# Patient Record
Sex: MALE | Race: WHITE | URBAN - METROPOLITAN AREA
[De-identification: names, ages, dates, MRNs, and addresses within clinical notes are randomized per-mention and may not be internally consistent; named-entity substitution may affect disease eponyms.]

---

## 2017-02-21 ENCOUNTER — MYC MEDICAL ADVICE (OUTPATIENT)
Dept: FAMILY MEDICINE | Facility: CLINIC | Age: 62
End: 2017-02-21

## 2017-02-21 DIAGNOSIS — F51.04 CHRONIC INSOMNIA: ICD-10-CM

## 2017-02-21 RX ORDER — ZOLPIDEM TARTRATE 12.5 MG/1
12.5 TABLET, FILM COATED, EXTENDED RELEASE ORAL
Qty: 30 TABLET | Refills: 5 | Status: SHIPPED | OUTPATIENT
Start: 2017-02-21 | End: 2017-08-30

## 2017-08-30 DIAGNOSIS — F51.04 CHRONIC INSOMNIA: ICD-10-CM

## 2017-08-30 RX ORDER — ZOLPIDEM TARTRATE 12.5 MG/1
12.5 TABLET, FILM COATED, EXTENDED RELEASE ORAL
Qty: 30 TABLET | Refills: 5 | Status: SHIPPED | OUTPATIENT
Start: 2017-08-30 | End: 2018-03-15

## 2017-08-30 NOTE — TELEPHONE ENCOUNTER
zolpidem (AMBIEN CR) 12.5 MG CR tablet      Last Written Prescription Date:  2/21/17  Last Fill Quantity: 30,   # refills: 5  Last Office Visit with [facility], [facility] or [facility] prescribing provider: 11/22/16  Future Office visit:       Routing refill request to provider for review/approval because:  Drug not on the [facility], [facility] or [facility] refill protocol or controlled substance

## 2018-03-15 DIAGNOSIS — F51.04 CHRONIC INSOMNIA: ICD-10-CM

## 2018-03-15 RX ORDER — ZOLPIDEM TARTRATE 12.5 MG/1
12.5 TABLET, FILM COATED, EXTENDED RELEASE ORAL
Qty: 30 TABLET | Refills: 0 | Status: SHIPPED | OUTPATIENT
Start: 2018-03-15

## 2018-03-15 NOTE — TELEPHONE ENCOUNTER
Please let patient know I sent in a refill, but he needs to establish with a doc out there.  I can't keep doing remote medicine

## 2018-03-15 NOTE — TELEPHONE ENCOUNTER
Requested Prescriptions   Pending Prescriptions Disp Refills     zolpidem (AMBIEN CR) 12.5 MG CR tablet 30 tablet 5     Sig: Take 1 tablet (12.5 mg) by mouth nightly as needed for sleep    There is no refill protocol information for this order        zolpidem (AMBIEN CR) 12.5 MG CR tablet      Last Written Prescription Date:  8/30/17  Last Fill Quantity: 30,   # refills: 5  Last Office Visit: 11/22/16  Future Office visit:       Routing refill request to provider for review/approval because:  Drug not on the [facility], [facility] or [facility] refill protocol or controlled substance

## 2018-06-29 DIAGNOSIS — F51.04 CHRONIC INSOMNIA: ICD-10-CM

## 2018-06-29 RX ORDER — ZOLPIDEM TARTRATE 12.5 MG/1
TABLET, FILM COATED, EXTENDED RELEASE ORAL
Qty: 30 TABLET | Refills: 0 | OUTPATIENT
Start: 2018-06-29

## 2018-06-29 NOTE — TELEPHONE ENCOUNTER
Requested Prescriptions   Pending Prescriptions Disp Refills     zolpidem (AMBIEN CR) 12.5 MG CR tablet [Pharmacy Med Name: Zolpidem Tartrate ER Oral Tablet Extended Release 12.5 MG]      Last Written Prescription Date:  3/15/18  Last Fill Quantity: 30 tablet,   # refills: 0  Last Office Visit: 11/22/16 Dr. Beck  Future Office visit:       Routing refill request to provider for review/approval because:  Drug not on the [facility], [facility] or  Health refill protocol or controlled substance 30 tablet 0     Sig: TAKE 1 TABLET BY MOUTH NIGHTLY AS NEEDED FOR SLEEP    There is no refill protocol information for this order

## 2018-06-29 NOTE — TELEPHONE ENCOUNTER
Routing refill request to provider for review/approval because:  Drug not on the [facility] refill protocol   Kimberlyn Aleman RN

## 2018-06-30 DIAGNOSIS — F51.04 CHRONIC INSOMNIA: ICD-10-CM

## 2018-07-02 RX ORDER — ZOLPIDEM TARTRATE 12.5 MG/1
TABLET, FILM COATED, EXTENDED RELEASE ORAL
Qty: 30 TABLET | Refills: 0 | OUTPATIENT
Start: 2018-07-02

## 2018-07-02 NOTE — TELEPHONE ENCOUNTER
Routing refill request to provider for review/approval because:  Drug not on the [facility] refill protocol   Teri Gresham RN

## 2018-07-03 DIAGNOSIS — F51.04 CHRONIC INSOMNIA: ICD-10-CM

## 2018-07-03 RX ORDER — ZOLPIDEM TARTRATE 12.5 MG/1
TABLET, FILM COATED, EXTENDED RELEASE ORAL
Qty: 30 TABLET | Refills: 0 | OUTPATIENT
Start: 2018-07-03

## 2018-07-03 NOTE — TELEPHONE ENCOUNTER
Requested Prescriptions   Pending Prescriptions Disp Refills     zolpidem (AMBIEN CR) 12.5 MG CR tablet [Pharmacy Med Name: Zolpidem Tartrate ER Oral Tablet Extended Release 12.5 MG]        Last Written Prescription Date:  3/15/18  Last Fill Quantity: 30 tablet,   # refills: 0  Last Office Visit: Dr. Beck  11/22/16  Future Office visit:       Routing refill request to provider for review/approval because:  Drug not on the [facility], [facility] or [facility] refill protocol or controlled substance 30 tablet 0     Sig: TAKE 1 TABLET BY MOUTH NIGHTLY AS NEEDED FOR SLEEP    There is no refill protocol information for this order

## 2018-08-14 DIAGNOSIS — F51.04 CHRONIC INSOMNIA: ICD-10-CM

## 2018-08-14 NOTE — TELEPHONE ENCOUNTER
Requested Prescriptions   Pending Prescriptions Disp Refills     zolpidem (AMBIEN CR) 12.5 MG CR tablet [Pharmacy Med Name: Zolpidem Tartrate ER Oral Tablet Extended Release 12.5 MG] 30 tablet 0     Sig: TAKE 1 TABLET BY MOUTH NIGHTLY AS NEEDED FOR SLEEP    There is no refill protocol information for this order        Routing refill request to provider for review/approval because:  Drug not on the [facility] refill protocol     Shiva Acevedo RN, BSN

## 2018-08-15 RX ORDER — ZOLPIDEM TARTRATE 12.5 MG/1
TABLET, FILM COATED, EXTENDED RELEASE ORAL
Qty: 30 TABLET | Refills: 0 | OUTPATIENT
Start: 2018-08-15

## 2019-10-05 ENCOUNTER — HEALTH MAINTENANCE LETTER (OUTPATIENT)
Age: 64
End: 2019-10-05

## 2020-02-10 ENCOUNTER — HEALTH MAINTENANCE LETTER (OUTPATIENT)
Age: 65
End: 2020-02-10

## 2020-11-14 ENCOUNTER — HEALTH MAINTENANCE LETTER (OUTPATIENT)
Age: 65
End: 2020-11-14

## 2021-03-28 ENCOUNTER — HEALTH MAINTENANCE LETTER (OUTPATIENT)
Age: 66
End: 2021-03-28

## 2021-09-12 ENCOUNTER — HEALTH MAINTENANCE LETTER (OUTPATIENT)
Age: 66
End: 2021-09-12

## 2022-02-07 ENCOUNTER — TRANSFERRED RECORDS (OUTPATIENT)
Dept: HEALTH INFORMATION MANAGEMENT | Facility: CLINIC | Age: 67
End: 2022-02-07
Payer: MEDICARE

## 2022-04-24 ENCOUNTER — HEALTH MAINTENANCE LETTER (OUTPATIENT)
Age: 67
End: 2022-04-24

## 2022-05-16 PROCEDURE — 88305 TISSUE EXAM BY PATHOLOGIST: CPT | Mod: TC,ORL | Performed by: COLON & RECTAL SURGERY

## 2022-05-16 PROCEDURE — 88305 TISSUE EXAM BY PATHOLOGIST: CPT | Mod: 26 | Performed by: PATHOLOGY

## 2022-05-17 ENCOUNTER — LAB REQUISITION (OUTPATIENT)
Dept: LAB | Facility: CLINIC | Age: 67
End: 2022-05-17
Payer: MEDICARE

## 2022-05-18 LAB
PATH REPORT.COMMENTS IMP SPEC: NORMAL
PATH REPORT.COMMENTS IMP SPEC: NORMAL
PATH REPORT.FINAL DX SPEC: NORMAL
PATH REPORT.GROSS SPEC: NORMAL
PATH REPORT.MICROSCOPIC SPEC OTHER STN: NORMAL
PATH REPORT.RELEVANT HX SPEC: NORMAL
PHOTO IMAGE: NORMAL

## 2022-11-19 ENCOUNTER — HEALTH MAINTENANCE LETTER (OUTPATIENT)
Age: 67
End: 2022-11-19

## 2023-06-01 ENCOUNTER — HEALTH MAINTENANCE LETTER (OUTPATIENT)
Age: 68
End: 2023-06-01

## 2025-05-06 ENCOUNTER — TRANSCRIBE ORDERS (OUTPATIENT)
Dept: OTHER | Age: 70
End: 2025-05-06

## 2025-05-06 DIAGNOSIS — M25.511 SHOULDER PAIN, RIGHT: Primary | ICD-10-CM

## 2025-05-08 ENCOUNTER — THERAPY VISIT (OUTPATIENT)
Dept: PHYSICAL THERAPY | Facility: CLINIC | Age: 70
End: 2025-05-08
Payer: MEDICARE

## 2025-05-08 DIAGNOSIS — M25.511 SHOULDER PAIN, RIGHT: ICD-10-CM

## 2025-05-08 ASSESSMENT — ACTIVITIES OF DAILY LIVING (ADL)
WHEN_LYING_ON_THE_INVOLVED_SIDE: 8
PUTTING_ON_A_SHIRT_THAT_BUTTONS_DOWN_THE_FRONT: 5
TOUCHING_THE_BACK_OF_YOUR_NECK: 5
PUTTING_ON_AN_UNDERSHIRT_OR_A_PULLOVER_SWEATER: 8
AT_ITS_WORST?: 8
REMOVING_SOMETHING_FROM_YOUR_BACK_POCKET: 4
REACHING_FOR_SOMETHING_ON_A_HIGH_SHELF: 10
WASHING_YOUR_BACK: 10
PUSHING_WITH_THE_INVOLVED_ARM: 6
PLACING_AN_OBJECT_ON_A_HIGH_SHELF: 10
CARRYING_A_HEAVY_OBJECT_OF_10_POUNDS: 0
PLEASE_INDICATE_YOR_PRIMARY_REASON_FOR_REFERRAL_TO_THERAPY:: SHOULDER
WASHING_YOUR_HAIR?: 6
PUTTING_ON_YOUR_PANTS: 4

## 2025-05-08 NOTE — PROGRESS NOTES
PHYSICAL THERAPY EVALUATION  Type of Visit: Evaluation        Fall Risk Screen:  Have you fallen 2 or more times in the past year?: No  Have you fallen and had an injury in the past year?: No    Subjective         Presenting condition or subjective complaint: pt has been having shoulder pain for several years but has become more problematic recently. He first went to see Dr. Trammell and then secondly with Dr. Gonzalez. Both are recommending a TSA at some point.  Date of onset: 02/08/25 (sx have been ongoing for 2-3 years but worsened last 3 months)    Relevant medical history: Osteoarthritis; Cancer; High blood pressure (stage 1 cancer on R kidney and had kidney removal in 2009,)   Dates & types of surgery: L4/5 nerve decompression   9/24    Prior diagnostic imaging/testing results: MRI; X-ray     Prior therapy history for the same diagnosis, illness or injury: Yes 2023        Living Environment  Social support: With a significant other or spouse   Type of home: House   Stairs to enter the home: Yes 3 Is there a railing: Yes     Ramp: No   Stairs inside the home: Yes 8 Is there a railing: Yes     Help at home: None  Equipment owned:       Employment: No    Hobbies/Interests: golf, swim, pickleball, bike, alpine ski race, wood craft    Patient goals for therapy: pickleball sleep on my right side    Pain assessment: See objective evaluation for additional pain details     Objective   Pain:   Location: anterior and posterior and will sometimes radiate down his lateral arm  At rest: 1/10  With activity: 8/10  Quality: dull ache, sharp that is intermittent if he does something wrong  Frequency: Intermittent  Time of Day: worse in am  Pain is exacerbated by: reaching overhead, behind back, lying on shoulder and lifting, swimming and pickleball  Pain is relieved by: rest, ice, and tylenol  Range of Motion:      AROM L shoulder: 150/140/T8/70     AAROM R shoulder: table slide flexion=94 and at [id number] tried wand flexion and  abduction but way too much hiking in [place] and pain    AROM R shoulder: 82/60/L5/41    Strength:  L shoulder: WNL    R shoulder: NT due to lack of motion  Notable mechanics: R scapular medial border prominence, winging and upward translation with abduction>flexion and pain specifically with eccentric phase.   Special Tests: NT  Palpation: Tension and hypertonicity noted at R [place], LS, rhomboids  Posture: Fwd head, rounded shoulders and elevated scapula on R  Joint mobility: hypomobility noted on R shoulder in all directions.      Assessment & Plan   CLINICAL IMPRESSIONS  Medical Diagnosis: shoulder pain, right with advanced OA    Treatment Diagnosis: R shoulder pain   Impression/Assessment: Patient is a 69 year old male with R shoulder complaints.  The following significant findings have been identified: Pain, Decreased ROM/flexibility, Decreased joint mobility, and Decreased strength. These impairments interfere with their ability to perform self care tasks, work tasks, recreational activities, household chores, and driving  as compared to previous level of function.     Clinical Decision Making (Complexity):  Clinical Presentation: Stable/Uncomplicated  Clinical Presentation Rationale: based on medical and personal factors listed in PT evaluation  Clinical Decision Making (Complexity): Low complexity    PLAN OF CARE  Treatment Interventions:  Interventions: Manual Therapy, Neuromuscular Re-education, Therapeutic Activity, Therapeutic Exercise    Long Term Goals     PT Goal 1  Goal Identifier: reaching and lifting  Goal Description: improve ROM and strength to tolerate lifting 10-15# in preps for surgery from floor to waist and reach into lower shelf of OH cabinet without hiking or pain  Rationale: to maximize safety and independence within the home;to maximize safety and independence with performance of ADLs and functional tasks;to maximize safety and independence with self cares  Target Date: 07/31/25      Frequency  of Treatment: 1x/wk for 6 weeks then every other week for 6 weeks  Duration of Treatment: 12 weeks    Recommended Referrals to Other Professionals: Physical Therapy  Education Assessment:        Risks and benefits of evaluation/treatment have been explained.   Patient/Family/caregiver agrees with Plan of Care.     Evaluation Time:     PT Eval, Low Complexity Minutes (98536): 18  Evaluation Only     Signing Clinician: ANNIE Teague [facility]                                                                                   OUTPATIENT PHYSICAL THERAPY      PLAN OF TREATMENT FOR OUTPATIENT REHABILITATION   Patient's Last Name, First Name, CONNERWANG  Sarabjit Arvizu YOB: 1955   Provider's Name   [facility]   Medical Record No.  [id number]     Onset Date: 02/08/25 (sx have been ongoing for 2-3 years but worsened last 3 months)  Start of Care Date: 05/08/25     Medical Diagnosis:  shoulder pain, right with advanced OA      PT Treatment Diagnosis:  R shoulder pain Plan of Treatment  Frequency/Duration: 1x/wk for 6 weeks then every other week for 6 weeks/ 12 weeks    Certification date from 05/08/25 to 07/31/25         See note for plan of treatment details and functional goals     Ynes Hairston PT                         I CERTIFY THE NEED FOR THESE SERVICES FURNISHED UNDER        THIS PLAN OF TREATMENT AND WHILE UNDER MY CARE .             Physician Signature               Date    X_____________________________________________________                  Referring Provider:  Malcolm Gonzalez    Initial Assessment  See Epic Evaluation- Start of Care Date: 05/08/25

## 2025-05-20 ENCOUNTER — THERAPY VISIT (OUTPATIENT)
Dept: PHYSICAL THERAPY | Facility: CLINIC | Age: 70
End: 2025-05-20
Payer: MEDICARE

## 2025-05-20 DIAGNOSIS — M25.511 ACUTE PAIN OF RIGHT SHOULDER: Primary | ICD-10-CM

## 2025-05-20 PROCEDURE — 97014 ELECTRIC STIMULATION THERAPY: CPT | Mod: GP | Performed by: PHYSICAL THERAPIST

## 2025-05-20 PROCEDURE — 97110 THERAPEUTIC EXERCISES: CPT | Mod: GP | Performed by: PHYSICAL THERAPIST

## 2025-05-25 ENCOUNTER — HEALTH MAINTENANCE LETTER (OUTPATIENT)
Age: 70
End: 2025-05-25

## 2025-05-30 PROBLEM — D12.6 ADENOMATOUS POLYP OF COLON: Status: ACTIVE | Noted: 2025-05-30

## 2025-06-12 ENCOUNTER — THERAPY VISIT (OUTPATIENT)
Dept: PHYSICAL THERAPY | Facility: CLINIC | Age: 70
End: 2025-06-12
Payer: MEDICARE

## 2025-06-12 DIAGNOSIS — G89.29 CHRONIC RIGHT SHOULDER PAIN: ICD-10-CM

## 2025-06-12 DIAGNOSIS — M25.511 CHRONIC RIGHT SHOULDER PAIN: ICD-10-CM

## 2025-06-12 DIAGNOSIS — M25.511 ACUTE PAIN OF RIGHT SHOULDER: Primary | ICD-10-CM

## 2025-06-26 ENCOUNTER — THERAPY VISIT (OUTPATIENT)
Dept: PHYSICAL THERAPY | Facility: CLINIC | Age: 70
End: 2025-06-26
Payer: MEDICARE

## 2025-06-26 DIAGNOSIS — G89.29 CHRONIC RIGHT SHOULDER PAIN: ICD-10-CM

## 2025-06-26 DIAGNOSIS — M25.511 CHRONIC RIGHT SHOULDER PAIN: ICD-10-CM

## 2025-06-26 DIAGNOSIS — M25.511 ACUTE PAIN OF RIGHT SHOULDER: Primary | ICD-10-CM

## 2025-06-26 NOTE — PROGRESS NOTES
06/26/25 0500   Appointment Info   Signing clinician's name / credentials Ynes Nicholson DPT, SCS   Total/Authorized Visits 12 ([facility])   Visits Used 6   Medical Diagnosis shoulder pain, right with advanced OA   PT Tx Diagnosis R shoulder pain   Other pertinent information neuralign   Quick Adds Certification   Progress Note/Certification   Start of Care Date 05/08/25   Onset of illness/injury or Date of Surgery 02/08/25  (sx have been ongoing for 2-3 years but worsened last 3 months)   Therapy Frequency 1x/wk for 6 weeks then every other week for 6 weeks   Predicted Duration 12 weeks   Certification date from 05/08/25   Certification date to 07/31/25   PT Goal 1   Goal Identifier reaching and lifting   Goal Description improve ROM and strength to tolerate lifting 10-15# in preps for surgery from floor to waist and reach into lower shelf of OH cabinet without hiking or pain   Rationale to maximize safety and independence within the home;to maximize safety and independence with performance of ADLs and functional tasks;to maximize safety and independence with self cares   Goal Progress can reach to lower shelf of OH cabinet and lift 10-15# from floor to waist  (no change)   Target Date 07/31/25   Date Met 06/26/25   Subjective Report   Subjective Report He has been dealing with a rash on his lower legs and he is anxious for his surgery next week.   Objective Measures   Objective Measures Objective Measure 1   Objective Measure 1   Objective Measure R shoulder AAROM:   Details wand flexion supine= 148, abd=91   PT Modalities   PT Modalities Electrical Stimulation   Treatment Interventions (PT)   Interventions Therapeutic Procedure/Exercise;Therapeutic Activity;Neuromuscular Re-education   Therapeutic Procedure/Exercise   Therapeutic Procedures: strength, endurance, ROM, flexibility minutes (70561) 28   PTRx Ther Proc 1 Cervical Retraction with Overpressure   PTRx Ther Proc 1 - Details 2 x 10    PTRx Ther  "Proc 2 Pendulum/Codmans   PTRx Ther Proc 2 - Details No Notes   PTRx Ther Proc 3 Periscapular Table Slide Flexion   PTRx Ther Proc 3 - Details No Notes   PTRx Ther Proc 4 Wand Shoulder Flexion Supine   PTRx Ther Proc 4 - Details 15   PTRx Ther Proc 5 Wand Shoulder External Rotation in Neutral   PTRx Ther Proc 5 - Details 15   PTRx Ther Proc 6 Periscapular Table Slide Abduction   PTRx Ther Proc 6 - Details 15   PTRx Ther Proc 7 Isometric Shoulder Flexion   PTRx Ther Proc 7 - Details 5\" x 10   PTRx Ther Proc 8 Shoulder Isometric Internal Rotation   PTRx Ther Proc 8 - Details 5\" x 10   PTRx Ther Proc 9 Isometric Shoulder Abduction    PTRx Ther Proc 9 - Details 5\" x 10   PTRx Ther Proc 10 Isometric Shoulder External Rotation   PTRx Ther Proc 10 - Details 5\" x 10   PTRx Ther Proc 11 Shoulder Extension in Standing   PTRx Ther Proc 11 - Details 1# x 30   PTRx Ther Proc 12 Shoulder Theraband Low Row/[place]   PTRx Ther Proc 12 - Details blue x 20   PTRx Ther Proc 13 Shoulder Theraband Rows   PTRx Ther Proc 13 - Details 2 x 10 with blue TB neuralign   PTRx Ther Proc 14 Cable Tricep Extension Standing   PTRx Ther Proc 14 - Details No Notes   PTRx Ther Proc 15 Biceps Hammer Curl - Alternating   PTRx Ther Proc 15 - Details 5# x 15   PTRx Ther Proc 16 Standing Extension   PTRx Ther Proc 16 - Details x10   Skilled Intervention cuing not to push into pain and hike shoulder   Patient Response/Progress dedrick well, too much crepitus and pain with 1# if he does ext   Neuromuscular Re-education   PTRx Neuro Re-ed 1 Push-Up Plus At Counter   PTRx Neuro Re-ed 1 - Details x25 with NMR not to go too fast arch back or hike shoulders   Plan   Home program see ptrx   Updates to plan of care added weight   Plan for next session hep until surgery then restart PT once MD recommends. DC for now.   Total Session Time   Timed Code Treatment Minutes 28   Total Treatment Time (sum of timed and untimed services) 28     DISCHARGE  Reason for Discharge: " Patient has met all goals.  Pt will have surgery next week and will begin post-op PT afterwards.     Equipment Issued: n/a    Discharge Plan: Patient to continue home program.    Referring Provider:  Malcolm Gonzalez

## 2025-07-07 ENCOUNTER — TRANSCRIBE ORDERS (OUTPATIENT)
Dept: OTHER | Age: 70
End: 2025-07-07

## 2025-07-07 DIAGNOSIS — Z96.611 STATUS POST REVERSE TOTAL REPLACEMENT OF RIGHT SHOULDER: ICD-10-CM

## 2025-07-07 DIAGNOSIS — M19.011 OSTEOARTHRITIS OF RIGHT GLENOHUMERAL JOINT: Primary | ICD-10-CM

## 2025-07-10 ENCOUNTER — PATIENT OUTREACH (OUTPATIENT)
Dept: CARE COORDINATION | Facility: CLINIC | Age: 70
End: 2025-07-10
Payer: MEDICARE

## 2025-07-31 ENCOUNTER — THERAPY VISIT (OUTPATIENT)
Dept: PHYSICAL THERAPY | Facility: CLINIC | Age: 70
End: 2025-07-31
Payer: MEDICARE

## 2025-07-31 DIAGNOSIS — Z96.611 STATUS POST REVERSE ARTHROPLASTY OF RIGHT SHOULDER: ICD-10-CM

## 2025-07-31 DIAGNOSIS — Z96.619 AFTERCARE FOLLOWING SHOULDER JOINT REPLACEMENT SURGERY, UNSPECIFIED LATERALITY: ICD-10-CM

## 2025-07-31 DIAGNOSIS — M25.511 ACUTE PAIN OF RIGHT SHOULDER: Primary | ICD-10-CM

## 2025-07-31 DIAGNOSIS — Z47.1 AFTERCARE FOLLOWING SHOULDER JOINT REPLACEMENT SURGERY, UNSPECIFIED LATERALITY: ICD-10-CM

## 2025-07-31 DIAGNOSIS — Z47.89 SURGICAL AFTERCARE, MUSCULOSKELETAL SYSTEM: ICD-10-CM

## 2025-07-31 DIAGNOSIS — M19.011 OSTEOARTHRITIS OF RIGHT GLENOHUMERAL JOINT: ICD-10-CM

## 2025-07-31 ASSESSMENT — ACTIVITIES OF DAILY LIVING (ADL)
AT_ITS_WORST?: 4
PUTTING_ON_YOUR_PANTS: 1
PUTTING_ON_A_SHIRT_THAT_BUTTONS_DOWN_THE_FRONT: 0
PLEASE_INDICATE_YOR_PRIMARY_REASON_FOR_REFERRAL_TO_THERAPY:: SHOULDER

## 2025-07-31 NOTE — PROGRESS NOTES
PHYSICAL THERAPY EVALUATION  Type of Visit: Evaluation       Fall Risk Screen:  Have you fallen 2 or more times in the past year?: No  Have you fallen and had an injury in the past year?: No    Subjective         Presenting condition or subjective complaint: shoulder Pt underwent rTSA on R on 7/2/25. Pt reports that at 2-week post-op he was advised could remove sling at four weeks. Over the past week and a half, has not used sling about 80-90% of the time per report unless driving and walking.     Date of onset: 07/02/25    Relevant medical history:     Dates & types of surgery: L 4/5 nerve decompress on 9/2024    Prior diagnostic imaging/testing results: MRI; X-ray     Prior therapy history for the same diagnosis, illness or injury: Yes pt    Prior Level of Function  Transfers:   Ambulation:   ADL:   IADL:     Living Environment  Social support: With a significant other or spouse   Type of home: House   Stairs to enter the home: Yes 3 Is there a railing: Yes     Ramp: No   Stairs inside the home: Yes 8 Is there a railing: Yes     Help at home: None  Equipment owned:       Employment: No    Hobbies/Interests: golf,biking,pickleball,tennis,lap swim,gym,alpine ski racing    Patient goals for therapy: everything!!!!    Pain assessment: See objective evaluation for additional pain details     Objective   SHOULDER EVALUATION  PAIN: Pain Level at Rest: 0/10  Pain Level with Use: 3/10  Pain Location: top of R shoulder  Pain Quality: Aching  Pain Frequency: intermittent  Pain is Worst: activity/position related  Pain is Exacerbated By: movement  Pain is Relieved By: Tylenol  Pain Progression: Improved  INTEGUMENTARY (edema, incisions):   POSTURE:   GAIT:   Weightbearing Status:   Assistive Device(s):   Gait Deviations:   BALANCE/PROPRIOCEPTION:   WEIGHTBEARING ALIGNMENT:   ROM:   (Degrees) Left AROM Left PROM Right AROM  Right PROM   Shoulder Flexion   108 (table slide)    Shoulder Extension       Shoulder Abduction        Shoulder Adduction       Shoulder Internal Rotation       Shoulder External Rotation       Shoulder Horizontal Abduction       Shoulder Horizontal Adduction       Shoulder Flexion ER       Shoulder Flexion IR       Elbow Extension       Elbow Flexion       Pain:   End feel:     STRENGTH: not assessed due to post-op status  FLEXIBILITY:   SPECIAL TESTS:   PALPATION:   JOINT MOBILITY:   CERVICAL SCREEN:     Assessment & Plan   CLINICAL IMPRESSIONS  Medical Diagnosis: Osteoarthritis of right glenohumeral joint  Status post reverse total replacement of right shoulder    Treatment Diagnosis: s/p R RTSA   Impression/Assessment: Patient is a 70 year old male with R shoulder complaints.  The following significant findings have been identified: Pain, Decreased ROM/flexibility, Decreased joint mobility, Decreased strength, Inflammation, Impaired muscle performance, and Decreased activity tolerance. These impairments interfere with their ability to perform self care tasks, recreational activities, household chores, and driving  as compared to previous level of function.     Clinical Decision Making (Complexity):  Clinical Presentation: Evolving/Changing  Clinical Presentation Rationale: based on medical and personal factors listed in PT evaluation  Clinical Decision Making (Complexity): Low complexity    PLAN OF CARE  Treatment Interventions:  Interventions: Manual Therapy, Neuromuscular Re-education, Therapeutic Activity, Therapeutic Exercise    Long Term Goals     PT Goal 1  Goal Identifier: reaching  Goal Description: Pt will be able to reach fully overherad without pain  Rationale: to maximize safety and independence with performance of ADLs and functional tasks;to maximize safety and independence within the home;to maximize safety and independence within the community;to maximize safety and independence with self cares  Target Date: 10/23/25      Frequency of Treatment: 1x/wk  Duration of Treatment: 12  wks    Recommended Referrals to Other Professionals:   Education Assessment:        Risks and benefits of evaluation/treatment have been explained.   Patient/Family/caregiver agrees with Plan of Care.     Evaluation Time:     PT Eval, Low Complexity Minutes (17851): 18       Signing Clinician: Matt Ellis PT        [facility]                                                                                   OUTPATIENT PHYSICAL THERAPY      PLAN OF TREATMENT FOR OUTPATIENT REHABILITATION   Patient's Last Name, First Name, Sarabjit Booker YOB: 1955   Provider's Name   [facility]   Medical Record No.  [id number]     Onset Date: 07/02/25  Start of Care Date: 07/31/25     Medical Diagnosis:  Osteoarthritis of right glenohumeral joint  Status post reverse total replacement of right shoulder      PT Treatment Diagnosis:  s/p R RTSA Plan of Treatment  Frequency/Duration: 1x/wk/ 12 wks    Certification date from 07/31/25 to 10/23/25         See note for plan of treatment details and functional goals     Matt Ellis, PT                         I CERTIFY THE NEED FOR THESE SERVICES FURNISHED UNDER        THIS PLAN OF TREATMENT AND WHILE UNDER MY CARE .             Physician Signature               Date    X_____________________________________________________                  Referring Provider:  Malcolm Gonzalez    Initial Assessment  See Epic Evaluation- Start of Care Date: 07/31/25

## 2025-08-07 ENCOUNTER — THERAPY VISIT (OUTPATIENT)
Dept: PHYSICAL THERAPY | Facility: CLINIC | Age: 70
End: 2025-08-07
Payer: MEDICARE

## 2025-08-07 DIAGNOSIS — Z96.611 STATUS POST REVERSE ARTHROPLASTY OF RIGHT SHOULDER: ICD-10-CM

## 2025-08-07 DIAGNOSIS — Z96.619 AFTERCARE FOLLOWING SHOULDER JOINT REPLACEMENT SURGERY, UNSPECIFIED LATERALITY: ICD-10-CM

## 2025-08-07 DIAGNOSIS — Z47.89 SURGICAL AFTERCARE, MUSCULOSKELETAL SYSTEM: ICD-10-CM

## 2025-08-07 DIAGNOSIS — M25.511 ACUTE PAIN OF RIGHT SHOULDER: Primary | ICD-10-CM

## 2025-08-07 DIAGNOSIS — M19.011 OSTEOARTHRITIS OF RIGHT GLENOHUMERAL JOINT: ICD-10-CM

## 2025-08-07 DIAGNOSIS — Z47.1 AFTERCARE FOLLOWING SHOULDER JOINT REPLACEMENT SURGERY, UNSPECIFIED LATERALITY: ICD-10-CM

## 2025-08-11 ENCOUNTER — THERAPY VISIT (OUTPATIENT)
Dept: PHYSICAL THERAPY | Facility: CLINIC | Age: 70
End: 2025-08-11
Payer: MEDICARE

## 2025-08-11 DIAGNOSIS — M25.511 ACUTE PAIN OF RIGHT SHOULDER: Primary | ICD-10-CM

## 2025-08-11 DIAGNOSIS — Z96.619 AFTERCARE FOLLOWING SHOULDER JOINT REPLACEMENT SURGERY, UNSPECIFIED LATERALITY: ICD-10-CM

## 2025-08-11 DIAGNOSIS — Z47.89 SURGICAL AFTERCARE, MUSCULOSKELETAL SYSTEM: ICD-10-CM

## 2025-08-11 DIAGNOSIS — Z47.1 AFTERCARE FOLLOWING SHOULDER JOINT REPLACEMENT SURGERY, UNSPECIFIED LATERALITY: ICD-10-CM

## 2025-08-11 DIAGNOSIS — M19.011 OSTEOARTHRITIS OF RIGHT GLENOHUMERAL JOINT: ICD-10-CM

## 2025-08-11 DIAGNOSIS — Z96.611 STATUS POST REVERSE ARTHROPLASTY OF RIGHT SHOULDER: ICD-10-CM

## 2025-08-11 PROCEDURE — 97110 THERAPEUTIC EXERCISES: CPT | Mod: GP | Performed by: PHYSICAL THERAPIST

## 2025-08-19 ENCOUNTER — THERAPY VISIT (OUTPATIENT)
Dept: PHYSICAL THERAPY | Facility: CLINIC | Age: 70
End: 2025-08-19
Payer: MEDICARE

## 2025-08-19 DIAGNOSIS — M19.011 OSTEOARTHRITIS OF RIGHT GLENOHUMERAL JOINT: ICD-10-CM

## 2025-08-19 DIAGNOSIS — Z47.89 SURGICAL AFTERCARE, MUSCULOSKELETAL SYSTEM: ICD-10-CM

## 2025-08-19 DIAGNOSIS — Z96.611 STATUS POST REVERSE ARTHROPLASTY OF RIGHT SHOULDER: ICD-10-CM

## 2025-08-19 DIAGNOSIS — Z47.1 AFTERCARE FOLLOWING SHOULDER JOINT REPLACEMENT SURGERY, UNSPECIFIED LATERALITY: ICD-10-CM

## 2025-08-19 DIAGNOSIS — Z96.619 AFTERCARE FOLLOWING SHOULDER JOINT REPLACEMENT SURGERY, UNSPECIFIED LATERALITY: ICD-10-CM

## 2025-08-19 DIAGNOSIS — M25.511 ACUTE PAIN OF RIGHT SHOULDER: Primary | ICD-10-CM

## 2025-08-19 PROCEDURE — 97110 THERAPEUTIC EXERCISES: CPT | Mod: GP | Performed by: PHYSICAL THERAPIST

## 2025-08-28 ENCOUNTER — THERAPY VISIT (OUTPATIENT)
Dept: PHYSICAL THERAPY | Facility: CLINIC | Age: 70
End: 2025-08-28
Payer: MEDICARE

## 2025-08-28 DIAGNOSIS — M19.011 OSTEOARTHRITIS OF RIGHT GLENOHUMERAL JOINT: ICD-10-CM

## 2025-08-28 DIAGNOSIS — M25.511 ACUTE PAIN OF RIGHT SHOULDER: Primary | ICD-10-CM

## 2025-08-28 DIAGNOSIS — Z47.1 AFTERCARE FOLLOWING SHOULDER JOINT REPLACEMENT SURGERY, UNSPECIFIED LATERALITY: ICD-10-CM

## 2025-08-28 DIAGNOSIS — Z47.89 SURGICAL AFTERCARE, MUSCULOSKELETAL SYSTEM: ICD-10-CM

## 2025-08-28 DIAGNOSIS — Z96.611 STATUS POST REVERSE ARTHROPLASTY OF RIGHT SHOULDER: ICD-10-CM

## 2025-08-28 DIAGNOSIS — Z96.619 AFTERCARE FOLLOWING SHOULDER JOINT REPLACEMENT SURGERY, UNSPECIFIED LATERALITY: ICD-10-CM

## 2025-09-04 ENCOUNTER — THERAPY VISIT (OUTPATIENT)
Dept: PHYSICAL THERAPY | Facility: CLINIC | Age: 70
End: 2025-09-04
Payer: MEDICARE

## 2025-09-04 DIAGNOSIS — Z96.611 STATUS POST REVERSE ARTHROPLASTY OF RIGHT SHOULDER: ICD-10-CM

## 2025-09-04 DIAGNOSIS — Z47.89 SURGICAL AFTERCARE, MUSCULOSKELETAL SYSTEM: ICD-10-CM

## 2025-09-04 DIAGNOSIS — Z96.619 AFTERCARE FOLLOWING SHOULDER JOINT REPLACEMENT SURGERY, UNSPECIFIED LATERALITY: ICD-10-CM

## 2025-09-04 DIAGNOSIS — Z47.1 AFTERCARE FOLLOWING SHOULDER JOINT REPLACEMENT SURGERY, UNSPECIFIED LATERALITY: ICD-10-CM

## 2025-09-04 DIAGNOSIS — M19.011 OSTEOARTHRITIS OF RIGHT GLENOHUMERAL JOINT: ICD-10-CM

## 2025-09-04 DIAGNOSIS — M25.511 ACUTE PAIN OF RIGHT SHOULDER: Primary | ICD-10-CM
